# Patient Record
(demographics unavailable — no encounter records)

---

## 2025-04-03 NOTE — HISTORY OF PRESENT ILLNESS
[Patient reported mammogram was normal] : Patient reported mammogram was normal [Patient reported PAP Smear was normal] : Patient reported PAP Smear was normal [Patient reported bone density results were normal] : Patient reported bone density results were normal [Normal Amount/Duration] :  normal amount and duration [Frequency: Q ___ days] : menstrual periods occur approximately every [unfilled] days [Menstrual Cramps] : menstrual cramps [Currently Active] : currently active [Men] : men [No] : No [Patient refuses STI testing] : Patient refuses STI testing [Mammogramdate] : 10 years ago  [PapSmeardate] : 12/2024 [BoneDensityDate] : 2024 [FreeTextEntry1] : 28 years ago

## 2025-06-12 NOTE — HISTORY OF PRESENT ILLNESS
[FreeTextEntry1] : NPA- Mole check [de-identified] : Chantelle Radford 68 y/o F presents for mole check  also with nail and hair concern nails have been breaking easily for many years - tried nail polish does not get manicures regularly uses various moisturizers including vaseline swims daily wears gloves when doing dishes  noticing significant hair thinning on the temples tried topical minoxidil for 6-7 years with initial improvement  Personal history of skin cancer: no Family history of skin cancer: father-Uknown  History of blistering sunburns: no History of tanning bed use: no Uses sunscreen regularly: no (allergic)   on two statins

## 2025-06-12 NOTE — ASSESSMENT
[FreeTextEntry1] : #Multiple benign nevi - chronic, stable - I discussed the chronic nature and course of the condition - Photoprotection discussed, recommend daily broad-spectrum sunscreen, SPF 30 or greater, UPF hat, clothing. - Pt educated on ABCDE of melanoma - Recommend self-skin exam and annual skin exam by MD - Pt instructed to return for new or changing lesions especially if any moles start to change, itch, or bleed  # Seborrheic keratosis, trunk/extremities   -chronic, stable -I discussed the chronic nature and course of the condition -counseled on benign nature -no treatment needed unless symptomatic  #Androgenetic alopecia- chronic, flaring - I discussed the chronic nature and course of this condition - reviewed treatment options, risks, potential SE not interested in finasteride tried and failed topical minoxidil -start oral minoxidil 1.25mg daily (0.5 tablet daily), increase to 2.5mg daily if tolerated. Reviewed potential SE including lightheadedness, dizziness, hypotension, leg swelling, unwanted hair growth elsewhere.  #Brittle nails daily swimmer emollients can try nail tejal formula 2  RTC 1 year for TBSE, sooner PRN 3 mos AGA f/up

## 2025-06-12 NOTE — PHYSICAL EXAM
[Alert] : alert [Oriented x 3] : ~L oriented x 3 [FreeTextEntry3] : PE:   General: well-appearing, alert, in no acute distress Full body skin exam performed examining scalp, head, face, ears, eyes, mouth, neck, chest, back, abdomen, axilla, b/l arms, b/l forearms, b/l hands, b/l fingernails, b/l thighs, b/l legs, b/l feet, b/l toenails, groin, buttocks Pertinent findings include: -scattered light brown to dark brown colored <6mm papules and macules on the trunk and extremities -brown stuck on papules, plaques on the trunk and extremities -bitemporal hair thinning -vertical ridging of all fingernails, bl great toenails